# Patient Record
Sex: FEMALE | Race: ASIAN | NOT HISPANIC OR LATINO | URBAN - METROPOLITAN AREA
[De-identification: names, ages, dates, MRNs, and addresses within clinical notes are randomized per-mention and may not be internally consistent; named-entity substitution may affect disease eponyms.]

---

## 2017-12-21 ENCOUNTER — OFFICE VISIT - HEALTHEAST (OUTPATIENT)
Dept: FAMILY MEDICINE | Facility: CLINIC | Age: 27
End: 2017-12-21

## 2017-12-21 DIAGNOSIS — N91.5 OLIGOMENORRHEA: ICD-10-CM

## 2017-12-21 DIAGNOSIS — Z00.00 ROUTINE GENERAL MEDICAL EXAMINATION AT A HEALTH CARE FACILITY: ICD-10-CM

## 2017-12-21 DIAGNOSIS — Z30.9 CONTRACEPTION MANAGEMENT: ICD-10-CM

## 2017-12-21 DIAGNOSIS — Z00.00 HEALTHCARE MAINTENANCE: ICD-10-CM

## 2017-12-21 RX ORDER — NORGESTIMATE AND ETHINYL ESTRADIOL 0.25-0.035
1 KIT ORAL DAILY
Qty: 3 PACKAGE | Refills: 4 | Status: SHIPPED | OUTPATIENT
Start: 2017-12-21

## 2017-12-21 ASSESSMENT — MIFFLIN-ST. JEOR: SCORE: 1266.79

## 2017-12-21 NOTE — ASSESSMENT & PLAN NOTE
Neglected to check thyroid and prolactin which should be done.  Has attempted to contact her but we have the wrong phone number.

## 2017-12-21 NOTE — ASSESSMENT & PLAN NOTE
Extensive discussion, patient with oligomenorrhea.  Also, living in [facility].  Discussed option of IUD, implantable progestin, oral contraceptive.  Decided on OCPs in the end, discussed potential side effects.    Also, discussed probable relative infertility with oligomenorrhea and since she wishes to conceive at some point, suggested starting with adequate time for potential infertility workup etc.

## 2017-12-29 ENCOUNTER — COMMUNICATION - HEALTHEAST (OUTPATIENT)
Dept: FAMILY MEDICINE | Facility: CLINIC | Age: 27
End: 2017-12-29

## 2021-05-31 VITALS — HEIGHT: 63 IN | WEIGHT: 128 LBS | BODY MASS INDEX: 22.68 KG/M2

## 2021-06-14 NOTE — PROGRESS NOTES
Adult Female Physical  A/P    Problem List Items Addressed This Visit        Unprioritized    Oligomenorrhea     Neglected to check thyroid and prolactin which should be done.  Has attempted to contact her but we have the wrong phone number.         Relevant Orders    Prolactin    Thyroid Stimulating Hormone (TSH)    Contraception management     Extensive discussion, patient with oligomenorrhea.  Also, living in [facility].  Discussed option of IUD, implantable progestin, oral contraceptive.  Decided on OCPs in the end, discussed potential side effects.    Also, discussed probable relative infertility with oligomenorrhea and since she wishes to conceive at some point, suggested starting with adequate time for potential infertility workup etc.         Healthcare maintenance     Pap smear done, declined need for STD screening.           Other Visit Diagnoses     Routine general medical examination at a health care facility    -  Primary    Relevant Orders    Gynecologic Cytology (PAP Smear)    Influenza, Seasonal,Quad Inj, 36+ MOS (Completed)    HPV vaccine 9 valent 3 dose IM (Completed)              HPI  Current Concerns/ Questions  27-year-old [place] female here for physical exam.    Patient is currently living in [facility].  She works there as a .  She is engaged to be  to someone who is in the service.    Sexually active.  Desiring contraception, partly to regulate irregular periods which are on the order of 4 times a year.  Blanca for contraception as she does not wish to have children for some time but wishes eventually to have them.    Periods are generally about 3 days in length.  PFSH:  Current medications reviewed as follows:  No current outpatient prescriptions on file prior to visit.     No current facility-administered medications on file prior to visit.      Patient Active Problem List   Diagnosis     Oligomenorrhea     No past medical history on file.  No past surgical  "history on file.  No family history on file.  History   Smoking Status     Never Smoker   Smokeless Tobacco     Not on file     Other Habits:  Alcohol/ Drug use?  No  Social History     Social History Narrative     Immunization History   Administered Date(s) Administered     DT (pediatric) 12/11/2006     HPV 9 Valent 03/23/2016, 04/22/2016, 12/21/2017     Hep B, Adult 04/22/2016     Hep B, historic 1992     Influenza [id number], 11/24/2009     Influenza, inj, historic,unspecified 10/23/2007, 11/12/2008, 10/19/2009     Influenza, seasonal,quad inj 36+ mos 03/23/2016, 12/21/2017     MMR 1992, 04/22/2016     Td,adult,historic,unspecified 12/11/2006     Tdap 03/23/2016     HealthCare Maintance  Immunizations: Up-to-date  Cancer Screening: Pap smear due  Vascular prevention glucose and lipid okay last year and blood pressure good  Weight normal  Body mass index is 23.04 kg/(m^2).    ROS  Full 10 system review including constitutional, respiratory, cardiac, gi, urinary, rheumatologic, neurologic, reproductive, dermatologic psychiatric is  performed (via questionnaire) and is negative      Objective  Physical Exam  Vitals:    12/21/17 1257   BP: 104/72   Patient Site: Left Arm   Patient Position: Sitting   Cuff Size: Adult Regular   Pulse: 67   Resp: 16   Temp: 98.3  F (36.8  C)   TempSrc: Oral   SpO2: 98%   Weight: 128 lb (58.1 kg)   Height: 5' 2.5\" (1.588 m)     Pleasant 27-year-old  Gen- alert and oriented x3, no acute distress  HEENT- Normocephalic atraumatic   pupils equal and reactive, EOMI.    TMs visualized and normal, ear canals normal.    Mouth moist with normal mucosa no ulceration, dentition normal or in good repair.  Neck- supple, no adenopathy or thyromegaly  Chest- Normal chest wall apperance, normal inspiration and expiration.  Clear to asculation.  Breast exam- normal, no dominant masses, skin changes.  No axillary adenopathy  CV- Regular rate and rhythm, normal tones, no murmus, gallops or " rubs.  Abd-  Soft, nodistended, nontender.  Normal bowel sounds, no mass or organ enlargement.  Genitalia-Normal vulva/ external genitalia.  Normal vaginal valt.  Cervix visualized with speculum exam and appears normal.  Bimanual exam performed- no adenexal mass or abnormality detected.  Pap smear performed  Ext- Atraumatic,  No synovial thickening. Good perfusion, no edema. Periph pulses detected  Skin- warm and dry, no rash  Neuro- Cranial nerves grossly intact.  Normal gait, normal strength.  Reflexes symmetric.  Coordination intact.    Diagnostics  None

## 2021-06-16 PROBLEM — Z30.9 CONTRACEPTION MANAGEMENT: Status: ACTIVE | Noted: 2017-12-21

## 2021-06-16 PROBLEM — Z00.00 HEALTHCARE MAINTENANCE: Status: ACTIVE | Noted: 2017-12-21

## 2021-06-16 PROBLEM — N91.5 OLIGOMENORRHEA: Status: ACTIVE | Noted: 2017-12-17
